# Patient Record
Sex: MALE | URBAN - METROPOLITAN AREA
[De-identification: names, ages, dates, MRNs, and addresses within clinical notes are randomized per-mention and may not be internally consistent; named-entity substitution may affect disease eponyms.]

---

## 2018-08-16 ENCOUNTER — OFFICE VISIT (OUTPATIENT)
Dept: URBAN - METROPOLITAN AREA CLINIC 23 | Facility: CLINIC | Age: 73
End: 2018-08-16
Payer: COMMERCIAL

## 2018-08-16 DIAGNOSIS — H35.341 MACULAR CYST, HOLE, OR PSEUDOHOLE, RIGHT EYE: Primary | ICD-10-CM

## 2018-08-16 PROCEDURE — 92134 CPTRZ OPH DX IMG PST SGM RTA: CPT | Performed by: OPHTHALMOLOGY

## 2018-08-16 PROCEDURE — 92004 COMPRE OPH EXAM NEW PT 1/>: CPT | Performed by: OPHTHALMOLOGY

## 2018-08-16 RX ORDER — PREDNISOLONE ACETATE 10 MG/ML
1 % SUSPENSION/ DROPS OPHTHALMIC
Qty: 10 | Refills: 5 | Status: INACTIVE
Start: 2018-08-16 | End: 2019-06-04

## 2018-08-16 RX ORDER — OFLOXACIN 3 MG/ML
0.3 % SOLUTION/ DROPS OPHTHALMIC
Qty: 5 | Refills: 3 | Status: INACTIVE
Start: 2018-08-16 | End: 2019-06-04

## 2018-08-16 ASSESSMENT — INTRAOCULAR PRESSURE
OS: 10
OD: 11

## 2018-08-16 NOTE — IMPRESSION/PLAN
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 OS. Condition: stable. Plan: Discussed diagnosis in detail with patient. No treatment is required at this time. Will continue to observe condition and or symptoms. May need surgery OS in the future.  OCT OS shows VMT w/Macular Cyst.

## 2018-08-16 NOTE — IMPRESSION/PLAN
Impression: Macular cyst, hole, or pseudohole, right eye: H35.341. OD. Condition: unstable. Vision: vision affected. Plan: Discussed diagnosis in detail with patient. Discussed risks of progression. Surgical treatment is recommended to repair the retina PPVx. Surgical risks and benefits were discussed, explained and understood by patient. Unable to tell how much vision will be recovered. Discussed gas bubble and post-op care: no traveling, flying or high altitude for approximately 6 - 8 weeks. All questions answered. Patient elects to proceed with recommendation. RL1. Educational material provided to patient. OCT OD shows full thickness macular hole. Erx'd Prednisolone and Ofloxacin to the pharmacy.

## 2018-09-04 ENCOUNTER — SURGERY (OUTPATIENT)
Dept: URBAN - METROPOLITAN AREA SURGERY 11 | Facility: SURGERY | Age: 73
End: 2018-09-04
Payer: COMMERCIAL

## 2018-09-04 PROCEDURE — 67042 VIT FOR MACULAR HOLE: CPT | Performed by: OPHTHALMOLOGY

## 2018-09-05 ENCOUNTER — POST-OPERATIVE VISIT (OUTPATIENT)
Dept: URBAN - METROPOLITAN AREA CLINIC 23 | Facility: CLINIC | Age: 73
End: 2018-09-05

## 2018-09-05 DIAGNOSIS — Z09 ENCNTR FOR F/U EXAM AFT TRTMT FOR COND OTH THAN MALIG NEOPLM: Primary | ICD-10-CM

## 2018-09-05 ASSESSMENT — INTRAOCULAR PRESSURE
OS: 10
OD: 7

## 2018-09-13 ENCOUNTER — POST-OPERATIVE VISIT (OUTPATIENT)
Dept: URBAN - METROPOLITAN AREA CLINIC 23 | Facility: CLINIC | Age: 73
End: 2018-09-13

## 2018-09-13 PROCEDURE — 99024 POSTOP FOLLOW-UP VISIT: CPT | Performed by: OPHTHALMOLOGY

## 2018-09-13 ASSESSMENT — INTRAOCULAR PRESSURE
OD: 11
OS: 13

## 2018-10-23 ENCOUNTER — POST-OPERATIVE VISIT (OUTPATIENT)
Dept: URBAN - METROPOLITAN AREA CLINIC 23 | Facility: CLINIC | Age: 73
End: 2018-10-23

## 2018-10-23 PROCEDURE — 99024 POSTOP FOLLOW-UP VISIT: CPT | Performed by: OPHTHALMOLOGY

## 2018-10-23 ASSESSMENT — INTRAOCULAR PRESSURE
OD: 13
OS: 11

## 2018-12-12 ENCOUNTER — OFFICE VISIT (OUTPATIENT)
Dept: URBAN - METROPOLITAN AREA CLINIC 23 | Facility: CLINIC | Age: 73
End: 2018-12-12
Payer: COMMERCIAL

## 2018-12-12 DIAGNOSIS — H43.822 VITREOMACULAR TRACTION OF LT EYE: Primary | ICD-10-CM

## 2018-12-12 DIAGNOSIS — H25.13 AGE-RELATED NUCLEAR CATARACT, BILATERAL: ICD-10-CM

## 2018-12-12 PROCEDURE — 92134 CPTRZ OPH DX IMG PST SGM RTA: CPT | Performed by: OPHTHALMOLOGY

## 2018-12-12 PROCEDURE — 92014 COMPRE OPH EXAM EST PT 1/>: CPT | Performed by: OPHTHALMOLOGY

## 2018-12-12 ASSESSMENT — INTRAOCULAR PRESSURE
OS: 11
OD: 12

## 2018-12-12 NOTE — IMPRESSION/PLAN
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 OS. Condition: stable. Plan: Discussed diagnosis in detail with patient. No treatment is required at this time. Will continue to observe condition and or symptoms. May need surgery OS in the future. OCT OS shows stable VMT / VMA.

## 2018-12-12 NOTE — IMPRESSION/PLAN
Impression: s/p PPVX for repair of Macular cyst, hole, or pseudohole, right eye 09/04/2018:  H35.341. OD. Condition: resolved with surgery. Vision: vision affected. Plan: Discussed diagnosis in detail with patient. Exam OD shows the macula is stable. No treatment is required at this time. Will continue to observe condition and or symptoms.

## 2018-12-12 NOTE — IMPRESSION/PLAN
Impression: Age-related nuclear cataract, bilateral: H25.13. OU. Vision: vision affected OD. Plan: Discussed diagnosis in detail with patient. Recommend a Cataract evaluation for consideration of Cataract sx OD.

## 2019-04-19 ENCOUNTER — OFFICE VISIT (OUTPATIENT)
Dept: URBAN - METROPOLITAN AREA CLINIC 23 | Facility: CLINIC | Age: 74
End: 2019-04-19
Payer: MEDICARE

## 2019-04-19 PROCEDURE — 92134 CPTRZ OPH DX IMG PST SGM RTA: CPT | Performed by: OPHTHALMOLOGY

## 2019-04-19 PROCEDURE — 92014 COMPRE OPH EXAM EST PT 1/>: CPT | Performed by: OPHTHALMOLOGY

## 2019-04-19 ASSESSMENT — INTRAOCULAR PRESSURE
OS: 12
OD: 12

## 2019-04-19 NOTE — IMPRESSION/PLAN
Impression: Age-related nuclear cataract, bilateral: H25.13. OD>>OS. Vision: vision affected OD. Plan: Discussed diagnosis in detail with patient.  Recommend a Cataract evaluation for the Right Eye

## 2019-04-19 NOTE — IMPRESSION/PLAN
Impression: s/p PPVX for repair of Macular cyst, hole, or pseudohole, right eye 09/04/2018:  H35.341. OD. Condition: resolved with surgery. Vision: vision affected. s/p PPvx,ERMx,C3F8,ICG,Ziyad OD 9/4/2018 Dr. Louie Cullen: Discussed diagnosis in detail with patient. Exam and OCT OD shows the macula is stable, no hole, no ERM, no CME. No treatment is required at this time. Will continue to observe condition and or symptoms.

## 2019-04-19 NOTE — IMPRESSION/PLAN
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 OS. Condition: stable. Vision: vision not affected. Plan: Discussed diagnosis in detail with patient. No treatment is required at this time. Will continue to observe condition and or symptoms. May need surgery OS in the future. OCT OS shows stable VMT / VMA.

## 2019-05-24 ENCOUNTER — OFFICE VISIT (OUTPATIENT)
Dept: URBAN - METROPOLITAN AREA CLINIC 23 | Facility: CLINIC | Age: 74
End: 2019-05-24
Payer: MEDICARE

## 2019-05-24 DIAGNOSIS — H25.811 COMBINED FORMS OF AGE-RELATED CATARACT, RIGHT EYE: Primary | ICD-10-CM

## 2019-05-24 PROCEDURE — 92014 COMPRE OPH EXAM EST PT 1/>: CPT | Performed by: OPTOMETRIST

## 2019-05-24 PROCEDURE — 92004 COMPRE OPH EXAM NEW PT 1/>: CPT | Performed by: OPTOMETRIST

## 2019-05-24 ASSESSMENT — VISUAL ACUITY
OD: 20/200
OS: 20/40

## 2019-05-24 ASSESSMENT — KERATOMETRY
OD: 43.63
OS: 43.25

## 2019-05-24 ASSESSMENT — INTRAOCULAR PRESSURE
OS: 12
OD: 11

## 2019-05-24 NOTE — IMPRESSION/PLAN
Impression: Combined forms of age-related cataract, right eye: H25.811. Plan: Cataracts account for the patient's complaints. Patient understands changing glasses will not improve vision. Patient desires to have surgery, recommend surgical consult. Briefly discussed advanced technology options. OD only, distance target.

## 2019-06-04 ENCOUNTER — OFFICE VISIT (OUTPATIENT)
Dept: URBAN - METROPOLITAN AREA CLINIC 23 | Facility: CLINIC | Age: 74
End: 2019-06-04
Payer: MEDICARE

## 2019-06-04 PROCEDURE — 99214 OFFICE O/P EST MOD 30 MIN: CPT | Performed by: OPHTHALMOLOGY

## 2019-06-04 RX ORDER — OFLOXACIN 3 MG/ML
0.3 % SOLUTION/ DROPS OPHTHALMIC
Qty: 5 | Refills: 3 | Status: INACTIVE
Start: 2019-06-04 | End: 2019-07-17

## 2019-06-04 RX ORDER — BROMFENAC 0.76 MG/ML
0.075 % SOLUTION/ DROPS OPHTHALMIC
Qty: 1 | Refills: 1 | Status: INACTIVE
Start: 2019-06-04 | End: 2019-06-20

## 2019-06-04 RX ORDER — PREDNISOLONE ACETATE 10 MG/ML
1 % SUSPENSION/ DROPS OPHTHALMIC
Qty: 1 | Refills: 1 | Status: ACTIVE
Start: 2019-06-04

## 2019-06-04 ASSESSMENT — INTRAOCULAR PRESSURE
OS: 14
OD: 13

## 2019-06-04 NOTE — IMPRESSION/PLAN
Impression: Combined forms of age-related cataract, bilateral: H25.813. Condition: established, worsening. Symptoms: could improve with surgery. Vision: vision affected. Plan: Cataract accounts for patient's complaints. Reviewed risks, benefits, and procedure. Patient desires surgery, schedule ce/iol OD, RL2, discussed lens options, distance refractive target, patient is clear for surgery in [place] [id number]. 
No surgery OS at this time, Pt states he is doing well with OS

## 2019-06-11 ENCOUNTER — PRE-OPERATIVE VISIT (OUTPATIENT)
Dept: URBAN - METROPOLITAN AREA CLINIC 23 | Facility: CLINIC | Age: 74
End: 2019-06-11
Payer: MEDICARE

## 2019-06-11 DIAGNOSIS — H25.813 COMBINED FORMS OF AGE-RELATED CATARACT, BILATERAL: Primary | ICD-10-CM

## 2019-06-11 PROCEDURE — 92136 OPHTHALMIC BIOMETRY: CPT | Performed by: OPHTHALMOLOGY

## 2019-06-11 ASSESSMENT — PACHYMETRY
OS: 3.18
OD: 3.42
OD: 24.10
OS: 23.96

## 2019-06-19 ENCOUNTER — SURGERY (OUTPATIENT)
Dept: URBAN - METROPOLITAN AREA SURGERY 11 | Facility: SURGERY | Age: 74
End: 2019-06-19
Payer: MEDICARE

## 2019-06-19 PROCEDURE — 66984 XCAPSL CTRC RMVL W/O ECP: CPT | Performed by: OPHTHALMOLOGY

## 2019-06-20 ENCOUNTER — POST-OPERATIVE VISIT (OUTPATIENT)
Dept: URBAN - METROPOLITAN AREA CLINIC 23 | Facility: CLINIC | Age: 74
End: 2019-06-20

## 2019-06-20 PROCEDURE — 99024 POSTOP FOLLOW-UP VISIT: CPT | Performed by: OPTOMETRIST

## 2019-06-20 ASSESSMENT — INTRAOCULAR PRESSURE
OD: 15
OS: 14

## 2019-06-27 ENCOUNTER — POST-OPERATIVE VISIT (OUTPATIENT)
Dept: URBAN - METROPOLITAN AREA CLINIC 23 | Facility: CLINIC | Age: 74
End: 2019-06-27

## 2019-06-27 PROCEDURE — 99024 POSTOP FOLLOW-UP VISIT: CPT | Performed by: OPTOMETRIST

## 2019-06-27 ASSESSMENT — VISUAL ACUITY
OS: 20/50+
OD: 20/60-

## 2019-06-27 ASSESSMENT — INTRAOCULAR PRESSURE
OS: 13
OD: 15

## 2019-07-17 ENCOUNTER — POST-OPERATIVE VISIT (OUTPATIENT)
Dept: URBAN - METROPOLITAN AREA CLINIC 23 | Facility: CLINIC | Age: 74
End: 2019-07-17

## 2019-07-17 PROCEDURE — 99024 POSTOP FOLLOW-UP VISIT: CPT | Performed by: OPTOMETRIST

## 2019-07-17 ASSESSMENT — INTRAOCULAR PRESSURE
OD: 11
OS: 13

## 2019-07-17 ASSESSMENT — VISUAL ACUITY: OD: 20/70
